# Patient Record
Sex: FEMALE | Race: WHITE | URBAN - METROPOLITAN AREA
[De-identification: names, ages, dates, MRNs, and addresses within clinical notes are randomized per-mention and may not be internally consistent; named-entity substitution may affect disease eponyms.]

---

## 2023-09-08 ENCOUNTER — OFFICE (OUTPATIENT)
Dept: URBAN - METROPOLITAN AREA CLINIC 16 | Facility: CLINIC | Age: 33
End: 2023-09-08
Payer: COMMERCIAL

## 2023-09-08 VITALS
SYSTOLIC BLOOD PRESSURE: 110 MMHG | HEART RATE: 68 BPM | HEART RATE: 68 BPM | SYSTOLIC BLOOD PRESSURE: 110 MMHG | WEIGHT: 123 LBS | HEART RATE: 68 BPM | DIASTOLIC BLOOD PRESSURE: 72 MMHG | HEIGHT: 63 IN | SYSTOLIC BLOOD PRESSURE: 110 MMHG | DIASTOLIC BLOOD PRESSURE: 72 MMHG | WEIGHT: 123 LBS | DIASTOLIC BLOOD PRESSURE: 72 MMHG | WEIGHT: 123 LBS | HEIGHT: 63 IN | HEIGHT: 63 IN

## 2023-09-08 DIAGNOSIS — R68.89 OTHER GENERAL SYMPTOMS AND SIGNS: ICD-10-CM

## 2023-09-08 DIAGNOSIS — K52.9 NONINFECTIVE GASTROENTERITIS AND COLITIS, UNSPECIFIED: ICD-10-CM

## 2023-09-08 DIAGNOSIS — K58.2 MIXED IRRITABLE BOWEL SYNDROME: ICD-10-CM

## 2023-09-08 DIAGNOSIS — Z83.79 FAMILY HISTORY OF OTHER DISEASES OF THE DIGESTIVE SYSTEM: ICD-10-CM

## 2023-09-08 PROCEDURE — 99204 OFFICE O/P NEW MOD 45 MIN: CPT | Performed by: INTERNAL MEDICINE

## 2023-09-08 PROCEDURE — 99214 OFFICE O/P EST MOD 30 MIN: CPT | Performed by: INTERNAL MEDICINE

## 2023-09-08 NOTE — SERVICENOTES
I will send in medications for diarrhea for postcholecystectomy diarrhea and start Colestid but with ulcers aphthous ulcers she will need work-up for inflammatory bowel.  We will evaluate accordingly.  I would like her to do complete B12 folic acid and vitamin supplementation such as prenatal vitamin

## 2023-09-08 NOTE — SERVICEHPINOTES
Elena De Anda   is a   33   year old   female   patient who is seen   at the request of   Tommie Wells   for a consultation/initial visit.  Elena was last seen in June of last year i.e. June 2022 for evaluation with abdominal pain and concerns for underlying irritable bowel and underlying reflux. She underwent EGD with findings of mild gastritis with biopsies negative for H. pylori and she had normal duodenum and normal esophagus. She does have a family history of IBD and ulcerative colitis and had normal-appearing colon and biopsies were taken rule out microscopic colitis. Random biopsies did show focal active colitisElena was noted to have aphthous ulcer and with concern for colitis was sent to see GI at this time for consultation she was noted to be seen in the ER with concern for an allergic reaction mouth sores as well as vaginal irritation and she had been given Atarax for itching and Diflucan for presumed yeast infection she had previously been admitted to the hospital and had negative STD testing. She was noted to have concerns for loose bowel history and IBS and noted to have mouth sores and did have focal active colitis and colon biopsies in 2022 with some of her unusual presentation considerations for Behcet's syndrome or aphthous ulcers was considered and she was given nystatin swish and swallow and benzocaine spray and recommendation for Aquaphor as well
sam Jimenez dad had ulcerative colitis and she did have her gallbladder out 2 months ago and she has had loose bowels which is expected after gallbladder which is likely of bile acid diarrhea and will have further treatment accordingly which she will need to have further labs and evaluation for inflammatory bowel

## 2023-10-05 LAB
C-REACTIVE PROTEIN: <0.3 MG/DL
CALPROTECTIN, STOOL: 145 MCG/G — HIGH
INFLAMMATORY BOWEL DISEASE DIFFERENTIATION PANEL: ANCA SCREEN: NEGATIVE
INFLAMMATORY BOWEL DISEASE DIFFERENTIATION PANEL: MYELOPEROXIDASE AB: <1 AI
INFLAMMATORY BOWEL DISEASE DIFFERENTIATION PANEL: PROTEINASE-3 ANTIBODY: <1 AI
INFLAMMATORY BOWEL DISEASE DIFFERENTIATION PANEL: S. CEREVISIAE AB IGA: 10.3 U
INFLAMMATORY BOWEL DISEASE DIFFERENTIATION PANEL: S. CEREVISIAE AB IGG: 13.1 U
REPORT COMMENT: (no result)

## 2023-10-24 ENCOUNTER — OFFICE (OUTPATIENT)
Dept: URBAN - METROPOLITAN AREA CLINIC 16 | Facility: CLINIC | Age: 33
End: 2023-10-24
Payer: COMMERCIAL

## 2023-10-24 VITALS — WEIGHT: 126 LBS | SYSTOLIC BLOOD PRESSURE: 112 MMHG | HEIGHT: 63 IN | DIASTOLIC BLOOD PRESSURE: 58 MMHG

## 2023-10-24 DIAGNOSIS — R19.5 OTHER FECAL ABNORMALITIES: ICD-10-CM

## 2023-10-24 DIAGNOSIS — K52.9 NONINFECTIVE GASTROENTERITIS AND COLITIS, UNSPECIFIED: ICD-10-CM

## 2023-10-24 PROCEDURE — 99214 OFFICE O/P EST MOD 30 MIN: CPT | Performed by: INTERNAL MEDICINE

## 2023-10-24 NOTE — SERVICENOTES
I will start mesalamine and dicyclomine for now lactose-free fiber and probiotic and follow-up in 3 months with considerations for restaging if further advancement of medications including biologic is indicated

## 2023-10-24 NOTE — SERVICEHPINOTES
Elena De Anda   is seen today for a follow-up visit.     Elena has issues with abdominal pain in her lower abdomen and diarrhea and had cholecystectomy as well. Her issues appear to worsen after having the gallbladder and are still present with abdominal discomfort and cramping. She did have recent laboratory testing with an elevated fecal calprotectin and does have a family history of IBD and ulcerative colitis but at least which her colonoscopy in August 2022 had a normal-appearing colon and biopsies were taken but this did reveal focal active colitisChristiano has a nonspecific colitis at this time and her history is also noted for some mouth sores and she had Diflucan for yeast infection and vaginal irritation.Her history is consistent with IBS and focal active colitis on colon biopsies in 2022. With her issues to be present she may need restaging of disease to help guide appropriate treatment at this time with elevated fecal calprotectin we will start mesalamine and treatment accordingly
br
sam   From a GI standpoint with her issues going on with upset stomach nausea and a discomfort in her central abdomen we will have upper GI and small bowel follow-through she has had CAT scans and imaging in the past. She has had discomfort near the incisional hernia repair but she does have mesh and she will follow-up with Dr. Solis and see if there is more that can be done from a surgical standpoint or even second opinion regardless at the [facility] and patient mid may discuss [facility] opinion if no further issue from mesh or otherwise

## 2024-01-25 ENCOUNTER — OFFICE (OUTPATIENT)
Dept: URBAN - METROPOLITAN AREA CLINIC 16 | Facility: CLINIC | Age: 34
End: 2024-01-25
Payer: COMMERCIAL

## 2024-01-25 VITALS
DIASTOLIC BLOOD PRESSURE: 72 MMHG | OXYGEN SATURATION: 98 % | WEIGHT: 124 LBS | HEIGHT: 63 IN | HEART RATE: 72 BPM | SYSTOLIC BLOOD PRESSURE: 108 MMHG

## 2024-01-25 DIAGNOSIS — K52.9 NONINFECTIVE GASTROENTERITIS AND COLITIS, UNSPECIFIED: ICD-10-CM

## 2024-01-25 DIAGNOSIS — R11.2 NAUSEA WITH VOMITING, UNSPECIFIED: ICD-10-CM

## 2024-01-25 DIAGNOSIS — K58.2 MIXED IRRITABLE BOWEL SYNDROME: ICD-10-CM

## 2024-01-25 DIAGNOSIS — Z90.49 ACQUIRED ABSENCE OF OTHER SPECIFIED PARTS OF DIGESTIVE TRACT: ICD-10-CM

## 2024-01-25 PROCEDURE — 99214 OFFICE O/P EST MOD 30 MIN: CPT | Performed by: INTERNAL MEDICINE

## 2024-01-25 NOTE — SERVICEHPINOTES
Elena De Anda   is seen today for a follow-up visit.     She is only 32 years old and was seen in June 2022 for abdominal pain and symptoms consistent with irritable bowel along with GERD and had an EGD and colonoscopy with prep fair to good and colonoscopy through the whole colon and ileum and biopsies were taken to rule out underlying disease microscopic colitis she does have a family history of IBD and UC and there is mild gastritis in the stomach. Regardless biopsies of the random colon showed focal active colitis and mild gastritis of the stomachShe does have a history of narcolepsy and PTSD. She has been managed for reflux and IBS and did have some nonspecific focal active colitis on biopsies of the colon randomly in 2022. Her IBD markers were negative otherwise but calprotectin elevated as belowShe was started on mesalamine given elevated fecal calprotectin and consider restaging of disease as well. She had discomfort near her incisional hernia repair and history of mesh and was recommended follow-up with Dr. Solis. And even consideration for [facility] opinion
sam Alvarado did not have help from mesalamine but does have a history of mesh and she does have worsening constipation over time and fluctuations with her bowels consistent with irritable bowel mainly constipation predominant she will use dicyclomine at this time and I will add MiraLAX 17 g in 8 ounces twice a day. She has nausea and vomiting after meals and upper distention as well and gastric emptying study for completeness

## 2024-01-25 NOTE — SERVICENOTES
She does have abdominal distention that will happen at least once a month and last 3 days in the upper abdomen mainly and had a cholecystectomy last year she has mesh in her abdomen with surgery and history of some mild colitis on biopsies of the colon and not on mesalamine as this did not help she is not on pantoprazole anymore.  She does have constipation and worsening issues with constipation and IBS and will have MiraLAX twice a day plus fiber and lactose-free.  She will continue dicyclomine as well.  If not improving she will have a restaging colonoscopy for evaluation.  With nausea and vomiting and distention she will have a gastric emptying study for completeness